# Patient Record
(demographics unavailable — no encounter records)

---

## 2024-10-24 NOTE — HISTORY OF PRESENT ILLNESS
[FreeTextEntry1] : 53M presents with bilateral submet 1 wounds. He applies Iodosorb to the right foot wound. His lateral right foot blister has since healed since last visit. Pt has been ambulating in sneakers with . Denies N/V/F/C/SOB.   7/18 L foot culture: Serratia marcescens, Staph psuedointermedius, GBS 8/2024 A1c: "a little over 10%" 10/24/24 BS (after breakfast): 163mg/dL

## 2024-10-24 NOTE — ASSESSMENT
[FreeTextEntry1] : 53M with right foot submet 1 wound to subq and right foot submet 5/ left submet 1 hyperkeratotic lesion - Pt seen and evaluated. - Right foot submet 1 wound to subQ, fibrogranular wound bed, hyperkeratotic periwound, no tracking, circumferential undermining, no drainage, no acute signs of infection. Right foot submet 5 metatarsal head callus, no acute signs of infection. Left foot submet 1 hyperkeratosis, no acute signs of infection. - Used a sterile #10 blade to excisionally debride the right foot submet 1 wound down to the level of but not beyond subQ.  - Used sterile #10 blade to excisionally debride the right foot submet 5 and left foot submet 1 hyperkeratotic lesion down to and not including dermis and not beyond. Patient tolerated procedure well.  -Cauterized the right foot submet 1 wound with silver nitrate. - Applied iodosorb to the right foot submet 1 wound followed by 4x4 gauze and eusebia. - Patient is to continue applying iodosorb to the right foot submet 1 wound. - RTC in 3 weeks.

## 2024-10-24 NOTE — ADDENDUM
[FreeTextEntry1] : Patient needs new orthotics due to worn out orthotics that have been working to reduce pressure on bilateral sesamoids. They are used everyday and are worn out. Patient needs new orthotics to maintain healing.

## 2024-10-24 NOTE — PHYSICAL EXAM
[1+] : left 1+ [Please See PDF for [employer]] : Please See PDF for [employer]. [de-identified] : No limb length deformity, external hip rotation 45 degrees b/l internal 25 degrees b/l. patella midline. Ankle ROM: 0 w/ knee extended 5 with knee flexed b/l. STJ ROM: 20 degrees inv, 10 degrees eversion b/l. MTJ ROM: 15 degrees eversion 20 eversion R, 10 inv, 20 ev L, 1st ray ROM: 5mm dorsiflexion plantarflexion b/l. Hallux ROM: reduced to 25 dorsi R 15 dorsi L, 35 plantarflexion R, 20 plantarflexion L. Arch height 1/5 b/l.  [de-identified] : Right foot submet 1 wound to subq, fibrogranular wound bed, hyperkeratotic periwound, no tracking, no undermining, no drainage, no acute signs of infection. Right foot dorsal to plantar 5th met head bulla, fluctuant, no acute signs of infection. Left foot submet 1 hyperkeratosis, no acute signs of infection. [de-identified] : BL sensation diminished to the level of the toes. [de-identified] : Right foot submet 1 wound to Dermis with hyperkeratosis overlying the wound, no drainage or erythema, Left submet 1 wound to subQ - no reoppned, mild sanginous drainage, hyperkeratotic wound edges, no malodor, no acute signs of infection.

## 2024-12-05 NOTE — ADDENDUM
Pt seen 7/18/17 [FreeTextEntry1] : Patient needs new orthotics due to worn out orthotics that have been working to reduce pressure on bilateral sesamoids. They are used everyday and are worn out. Patient needs new orthotics to maintain healing.

## 2024-12-05 NOTE — ASSESSMENT
[FreeTextEntry1] : 53M with right foot submet 1 wound to subq and right foot submet 5/ left submet 1 hyperkeratotic lesion - Pt seen and evaluated. - Right foot submet 1 wound to subQ, fibronecrotic wound bed, hyperkeratotic periwound, no tracking, circumferential undermining, no drainage, mild malodor, no acute signs of infection. Right foot submet 5 metatarsal head callus, no acute signs of infection. Left foot submet 1 hyperkeratotic lesion  - Used a sterile #10 blade to excisionally debride the right foot submet 1 and left foot submet 1 wound down to the level of but not beyond subQ but not beyond.  - Used sterile #10 blade to excisionally debride the right foot submet 5 and left foot submet 1 hyperkeratotic lesion down to and not including dermis and not beyond. Patient tolerated procedure well.  - Rx Doxycycline x 10 days with 2 refills  - Cauterized the right foot submet 1 wound with silver nitrate  - Applied iodosorb to the right foot submet 1 and left foot submet 1 wound followed by 4x4 gauze and eusebia. - RTC in 2 weeks.

## 2024-12-05 NOTE — HISTORY OF PRESENT ILLNESS
[FreeTextEntry1] : 53M presents with bilateral submet 1 wounds. He applies Iodosorb to the right foot wound. His lateral right foot blister has since healed since last visit. Pt has been ambulating in sneakers with . Denies N/V/F/C/SOB.   7/18 L foot culture: Serratia marcescens, Staph psuedointermedius, GBS 8/2024 A1c: "a little over 10%" 10/24/24 BS (after breakfast): 201mg/dL

## 2024-12-05 NOTE — PHYSICAL EXAM
[1+] : left 1+ [Please See PDF for [employer]] : Please See PDF for [employer]. [de-identified] : No limb length deformity, external hip rotation 45 degrees b/l internal 25 degrees b/l. patella midline. Ankle ROM: 0 w/ knee extended 5 with knee flexed b/l. STJ ROM: 20 degrees inv, 10 degrees eversion b/l. MTJ ROM: 15 degrees eversion 20 eversion R, 10 inv, 20 ev L, 1st ray ROM: 5mm dorsiflexion plantarflexion b/l. Hallux ROM: reduced to 25 dorsi R 15 dorsi L, 35 plantarflexion R, 20 plantarflexion L. Arch height 1/5 b/l.  [de-identified] : Right foot submet 1 wound to subq, fibrogranular wound bed, hyperkeratotic periwound, no tracking, no undermining, no drainage, no acute signs of infection. Right foot dorsal to plantar 5th met head bulla, fluctuant, no acute signs of infection. Left foot submet 1 hyperkeratosis, no acute signs of infection. [de-identified] : BL sensation diminished to the level of the toes. [de-identified] : Right foot submet 1 wound to Dermis with hyperkeratosis overlying the wound, no drainage or erythema, Left submet 1 wound to subQ - no reoppned, mild sanginous drainage, hyperkeratotic wound edges, no malodor, no acute signs of infection.

## 2024-12-19 NOTE — ASSESSMENT
[FreeTextEntry1] : 53M with right foot submet 1 wound to subq and right foot submet 5/ left submet 1 hyperkeratotic lesion - Pt seen and evaluated. - Right foot submet 1 wound to subQ, granular wound bed, hyperkeratotic periwound, no tracking, circumferential undermining, no drainage, mild malodor, no acute signs of infection. Right foot submet 5 metatarsal head callus, no acute signs of infection. Left foot submet 1 hyperkeratotic lesion.  - Used a sterile #10 blade to excisionally debride the right foot submet 1 and left foot submet 1 wound down to the level of but not beyond subQ but not beyond.  - Used sterile #10 blade to excisionally debride the right foot submet 5 and left foot submet 1 hyperkeratotic lesion down to and not including dermis and not beyond. Patient tolerated procedure well.  - Continue Doxycycline.  Last visit provided Rx for Doxycycline x 10 days with 2 refills  - Cauterized the right foot submet 1 wound with silver nitrate  - Applied acticoat to the right foot submet 1 and left foot submet 1 wound followed by 4x4 gauze and eusebia. - RTC in 2 weeks.

## 2024-12-19 NOTE — PHYSICAL EXAM
[1+] : left 1+ [Please See PDF for [employer]] : Please See PDF for [employer]. [de-identified] : No limb length deformity, external hip rotation 45 degrees b/l internal 25 degrees b/l. patella midline. Ankle ROM: 0 w/ knee extended 5 with knee flexed b/l. STJ ROM: 20 degrees inv, 10 degrees eversion b/l. MTJ ROM: 15 degrees eversion 20 eversion R, 10 inv, 20 ev L, 1st ray ROM: 5mm dorsiflexion plantarflexion b/l. Hallux ROM: reduced to 25 dorsi R 15 dorsi L, 35 plantarflexion R, 20 plantarflexion L. Arch height 1/5 b/l.  [de-identified] : Right foot submet 1 wound to subq, fibrogranular wound bed, hyperkeratotic periwound, no tracking, no undermining, no drainage, no acute signs of infection. Right foot dorsal to plantar 5th met head bulla, fluctuant, no acute signs of infection. Left foot submet 1 hyperkeratosis, no acute signs of infection. [de-identified] : BL sensation diminished to the level of the toes. [de-identified] : Right foot submet 1 wound to Dermis with hyperkeratosis overlying the wound, no drainage or erythema, Left submet 1 wound to subQ - no reoppned, mild sanginous drainage, hyperkeratotic wound edges, no malodor, no acute signs of infection.

## 2025-03-06 NOTE — HISTORY OF PRESENT ILLNESS
[FreeTextEntry1] : 53M presents with bilateral submet 1 wounds. He applies Iodosorb to the right foot wound. His lateral right foot blister has since healed since last visit. Pt has been ambulating in sneakers with . Pt states he has not worn his orthotic for months secondary to them "not working" and the wound contaminating the orthotic. Denies N/V/F/C/SOB.    Does not recall last A1c - scheduled to see Rodrick in April  3/6/24: 175 mg/dl

## 2025-03-06 NOTE — ASSESSMENT
[FreeTextEntry1] : 53M with right foot submet 1 wound to subq and right foot submet 1 hyperkeratotic lesion - Pt seen and evaluated. - Right foot submet 1 wound to subQ, granular wound bed, hyperkeratotic periwound, no tracking, circumferential undermining, no drainage, mild malodor, no acute signs of infection. Right foot submet 1 wound to dermis, no acute signs of infection.  - Used a sterile #10 blade to excisionally debride the right foot submet 1 and left foot submet 1 wound down to the level of but not beyond subQ but not beyond.  - Used sterile #10 blade to excisionally debride the right foot submet 1 hyperkeratotic lesion down to and not including dermis and not beyond. Patient tolerated procedure well.  - Cauterized the right foot submet 1 wound with silver nitrate  - Applied silver nitrate, acticote to the right foot submet 1 followed by 4x4 gauze and eusebia. - Recommend pt return to [facility] with orthotic in order for adequate adjusts to be made. Recommend re-staring the orthotic and wearing it all times.  - Recommend off loading of right lower extremity  - RTC in 3 weeks.

## 2025-03-06 NOTE — PHYSICAL EXAM
[1+] : left 1+ [Please See PDF for [employer]] : Please See PDF for [employer]. [de-identified] : No limb length deformity, external hip rotation 45 degrees b/l internal 25 degrees b/l. patella midline. Ankle ROM: 0 w/ knee extended 5 with knee flexed b/l. STJ ROM: 20 degrees inv, 10 degrees eversion b/l. MTJ ROM: 15 degrees eversion 20 eversion R, 10 inv, 20 ev L, 1st ray ROM: 5mm dorsiflexion plantarflexion b/l. Hallux ROM: reduced to 25 dorsi R 15 dorsi L, 35 plantarflexion R, 20 plantarflexion L. Arch height 1/5 b/l.  [de-identified] : Right foot submet 1 wound to subq, fibrogranular wound bed, hyperkeratotic periwound, no tracking, no undermining, no drainage, no acute signs of infection. Right foot dorsal to plantar 5th met head bulla, fluctuant, no acute signs of infection. Left foot submet 1 hyperkeratosis, no acute signs of infection. [de-identified] : BL sensation diminished to the level of the toes. [de-identified] : - Right foot submet 1 wound to subQ, granular wound bed, hyperkeratotic periwound, no tracking, circumferential undermining, no drainage, mild malodor, no acute signs of infection. Right foot submet 1 wound to dermis, no acute signs of infection.

## 2025-04-03 NOTE — ASSESSMENT
[FreeTextEntry1] : 53M with right foot submet 1 wound to subq and right foot submet 1 hyperkeratotic lesion - Pt seen and evaluated. - Right foot submet 1 wound to subQ, granular wound bed, hyperkeratotic periwound, no tracking, circumferential undermining, no drainage, mild malodor, no acute signs of infection. Right foot submet 1 wound to dermis, no acute signs of infection.  - Used a sterile #10 blade to excisionally debride the right foot submet 1 wound down to the level of subQ but not beyond. Patient tolerated procedure well.  - Used sterile #10 blade to excisionally debride the left foot submet 1 hyperkeratotic lesion down to including subQ but not beyond. Left foot submet 1 wound to subQ, no malodor, no pus. Patient tolerated procedure well.  - Cauterized the right foot submet 1 wound with silver nitrate  - Applied silver nitrate, acticote to the right foot submet 1 followed by 4x4 gauze and eusebia. - Recommend off loading of right lower extremity  - RTC in 3 weeks.

## 2025-04-03 NOTE — HISTORY OF PRESENT ILLNESS
[FreeTextEntry1] : 53M presents with bilateral submet 1 wounds. He applies Iodosorb to the right foot wound. His lateral right foot blister has since healed since last visit. Pt has been ambulating in sneakers with . Pt states he has not worn his orthotic for months secondary to them "not working" and the wound contaminating the orthotic. Denies N/V/F/C/SOB.    Does not recall last A1c - scheduled to see Rodrick in July 4/3/24: 171 mg/dl

## 2025-04-03 NOTE — PHYSICAL EXAM
[1+] : left 1+ [Please See PDF for [employer]] : Please See PDF for [employer]. [de-identified] : No limb length deformity, external hip rotation 45 degrees b/l internal 25 degrees b/l. patella midline. Ankle ROM: 0 w/ knee extended 5 with knee flexed b/l. STJ ROM: 20 degrees inv, 10 degrees eversion b/l. MTJ ROM: 15 degrees eversion 20 eversion R, 10 inv, 20 ev L, 1st ray ROM: 5mm dorsiflexion plantarflexion b/l. Hallux ROM: reduced to 25 dorsi R 15 dorsi L, 35 plantarflexion R, 20 plantarflexion L. Arch height 1/5 b/l.  [de-identified] : Right foot submet 1 wound to subq, fibrogranular wound bed, hyperkeratotic periwound, no tracking, no undermining, no drainage, no acute signs of infection. Right foot dorsal to plantar 5th met head bulla, fluctuant, no acute signs of infection. Left foot submet 1 hyperkeratosis, no acute signs of infection. [de-identified] : BL sensation diminished to the level of the toes. [de-identified] : - Right foot submet 1 wound to subQ, granular wound bed, hyperkeratotic periwound, no tracking, circumferential undermining, no drainage, mild malodor, no acute signs of infection. Right foot submet 1 wound to dermis, no acute signs of infection.

## 2025-04-16 NOTE — HISTORY OF PRESENT ILLNESS
[FreeTextEntry1] : ER f/u [de-identified] : Mr. ROCHELLE MOSCOSO is a 54 year old male hx of DM-uncontrolled with a1c >10, diabetic foot ulcer following w/ wound care p/w lower back pain. States pain began across lower back went to ER as was concerned about stone. CT negative for stone, however showed  mild inguinal LAD, hepatomegaly. He was discharged w/ muscle relaxer, prednisone 20mg, and ibuprofen.  Did not see improvement with one tab of prednisone he took-decided to stop. FS not at goal currently in 200s

## 2025-04-16 NOTE — HISTORY OF PRESENT ILLNESS
[FreeTextEntry1] : ER f/u [de-identified] : Mr. ROCHELLE MOSCOSO is a 54 year old male hx of DM-uncontrolled with a1c >10, diabetic foot ulcer following w/ wound care p/w lower back pain. States pain began across lower back went to ER as was concerned about stone. CT negative for stone, however showed  mild inguinal LAD, hepatomegaly. He was discharged w/ muscle relaxer, prednisone 20mg, and ibuprofen.  Did not see improvement with one tab of prednisone he took-decided to stop. FS not at goal currently in 200s

## 2025-04-16 NOTE — PHYSICAL EXAM
[Normal] : no acute distress, well nourished, well developed and well-appearing [No CVA Tenderness] : no CVA  tenderness [No Spinal Tenderness] : no spinal tenderness [Grossly Normal Strength/Tone] : grossly normal strength/tone

## 2025-04-16 NOTE — COUNSELING
[Encouraged to increase physical activity] : Encouraged to increase physical activity I will STOP taking the medications listed below when I get home from the hospital:  None

## 2025-04-24 NOTE — ASSESSMENT
[FreeTextEntry1] : 53M with right foot submet 1 wound to subq and right foot submet 1 hyperkeratotic lesion - Pt seen and evaluated. - Right foot submet 1 wound to subQ, granular wound bed, hyperkeratotic periwound, no tracking, circumferential undermining, no drainage, mild malodor, no acute signs of infection. Right foot submet 1 wound to dermis, no acute signs of infection. new blister noted to the dorsal lateral forefoot. - Used a sterile #10 blade to excisionally debride the right foot submet 1 wound down to the level of subQ but not beyond. Patient tolerated procedure well.  - Used sterile #10 blade to excisionally debride the left foot submet 1 hyperkeratotic lesion down to including subQ but not beyond. Left foot submet 1 wound to subQ, no malodor, no pus. Patient tolerated procedure well.  -Right foot blister was lanced while keeping roof intact - Applied gv, acticote to the right foot submet 1 followed by 4x4 gauze and eusebia. - Recommend off loading of right lower extremity  -Aseptic debridmeent of toenails 1-5 bilaterally  - RTC in 2 weeks. 
Unable to determine Suicide Risk

## 2025-04-24 NOTE — PHYSICAL EXAM
[1+] : left 1+ [Please See PDF for [employer]] : Please See PDF for [employer]. [de-identified] : No limb length deformity, external hip rotation 45 degrees b/l internal 25 degrees b/l. patella midline. Ankle ROM: 0 w/ knee extended 5 with knee flexed b/l. STJ ROM: 20 degrees inv, 10 degrees eversion b/l. MTJ ROM: 15 degrees eversion 20 eversion R, 10 inv, 20 ev L, 1st ray ROM: 5mm dorsiflexion plantarflexion b/l. Hallux ROM: reduced to 25 dorsi R 15 dorsi L, 35 plantarflexion R, 20 plantarflexion L. Arch height 1/5 b/l.  [de-identified] : Right foot submet 1 wound to subq, fibrogranular wound bed, hyperkeratotic periwound, no tracking, no undermining, no drainage, no acute signs of infection. Right foot dorsal to plantar 5th met head bulla, fluctuant, no acute signs of infection. Left foot submet 1 hyperkeratosis, no acute signs of infection. [de-identified] : BL sensation diminished to the level of the toes. [de-identified] : - Right foot submet 1 wound to subQ, granular wound bed, hyperkeratotic periwound, no tracking, circumferential undermining, no drainage, mild malodor, no acute signs of infection. Right foot submet 1 wound to dermis, no acute signs of infection.

## 2025-04-24 NOTE — HISTORY OF PRESENT ILLNESS
[FreeTextEntry1] : 53M presents with bilateral submet 1 wounds. He applies Iodosorb to the right foot wound. His lateral right foot blister has since healed since last visit. Pt has been ambulating in sneakers with . Pt states he has not worn his orthotic for months secondary to them "not working" and the wound contaminating the orthotic. Denies N/V/F/C/SOB.    Does not recall last A1c - scheduled to see Rodrick in July 4/24/25: 186 mg/dl

## 2025-05-08 NOTE — ASSESSMENT
[FreeTextEntry1] : 54M with right foot submet 1 wound to subq and lateral 5th metatarsal head deroofed bulla, and left foot submet 1 hyperkeratotic lesion. - Pt seen and evaluated. - Right foot submet 1 wound to subq, granular wound bed, mild hyperkeratotic periwound, no tracking, no undermining, no drainage, no malodor, no acute signs of infection. Left foot lateral 5th metatarsal head de-roofed bulla, no acute signs of infection. Right foot submet 1 hyperkeratosis, no open wound, no acute signs of infection. - Used a sterile #10 blade to excisionally debride the right foot submet 1 wound down to the level of subq but not beyond. - Used sterile #10 blade to excisionally debride the left foot submet 1 hyperkeratosis down to the level of but not beyond epidermis. - Applied gentian violet to the right foot lateral 5th metatarsal de-roofed bulla. Pt may continue to apply bacitracin to the area at home. - Applied acticoat and drawtex to the right foot submet 1 wound followed by dry sterile dressing to be continued daily at home.  - Recommend off loading the RLE with orthotics. Pt spoke to Walter about creating a dispersion pad in the orthotics so he will not have to give up his orthotics for 2 weeks. - RTC in 3 weeks.

## 2025-05-08 NOTE — PHYSICAL EXAM
[1+] : left 1+ [Please See PDF for [employer]] : Please See PDF for [employer]. [de-identified] : No limb length deformity, external hip rotation 45 degrees b/l internal 25 degrees b/l. patella midline. Ankle ROM: 0 w/ knee extended 5 with knee flexed b/l. STJ ROM: 20 degrees inv, 10 degrees eversion b/l. MTJ ROM: 15 degrees eversion 20 eversion R, 10 inv, 20 ev L, 1st ray ROM: 5mm dorsiflexion plantarflexion b/l. Hallux ROM: reduced to 25 dorsi R 15 dorsi L, 35 plantarflexion R, 20 plantarflexion L. Arch height 1/5 b/l.  [de-identified] : Right foot submet 1 wound to subq, granular wound bed, mild hyperkeratotic periwound, no tracking, no undermining, no drainage, no malodor, no acute signs of infection. Left foot lateral 5th metatarsal head de-roofed bulla, no acute signs of infection. Right foot submet 1 hyperkeratosis, no open wound, no acute signs of infection. [de-identified] : BL sensation diminished to the level of the toes. [de-identified] : - Right foot submet 1 wound to subQ, granular wound bed, hyperkeratotic periwound, no tracking, circumferential undermining, no drainage, mild malodor, no acute signs of infection. Right foot submet 1 wound to dermis, no acute signs of infection.

## 2025-05-08 NOTE — HISTORY OF PRESENT ILLNESS
[FreeTextEntry1] : 54M presents with BL submet 1 wounds. Pt was applying acticoat to the right foot wound. States his left foot wound was closed. Pt has been ambulating in sneakers with orthotics but he needs adjustments and the person he is using needs him to drop off his orthotics for 2 weeks but he feels he cannot be without his orthotics for 2 weeks. Denies N/V/F/C/SOB.    Does not recall last A1c - scheduled to see Rodrick in July 5/8/25: 155 mg/dL

## 2025-05-15 NOTE — HISTORY OF PRESENT ILLNESS
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 Has been using Trimix.  Represcribed.   H/o left ureteroscopy, removal of left UVJ stone, removed. 2016 RTC 1 month     07/12/2023: Mr. MOSCOSO is a 52 year male who presents today for a follow up for back ache and h/o left ureteral stones   Renal sonogram today. No hydronephrosis or stones. Back ache secondary to musculoskeletal and not of renal origin. .   ED: Pt has DM. has been using Trimix, 3 -4 units. Effects are less. Advised to increase to 5 units. Refilled prescriptions today.   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 Pt has been using Trimix 5 units. Pt reports it works but not as great as he would like. Pt advised to titrate to a dose that work for him 2 units at a time. Pt will continue. Rx for TriMix given today.  H/o Renal stones and (back pain): H/o left ureteroscopy, removal of left UVJ stone, removed. 2016.  Pt c/o of back pain still present on the right side. He reports  Last Renal Sonogram revealed: No hydronephrosis or stones.   Renal Sonogram: Will repeat Renal sonogram to evaluate right flank pain Findings: Both kidneys are normal in size and echogenicity without stones, hydronephrosis or solid masses visualized  Back ache secondary to musculoskeletal and not of renal origin. Pt advised follow back pain with orthopedic Dr. No further intervention at present.  Pt also advised to decrease body weight for overall health.  Current weight is 318lb BMI 39.75kg/m2  Previous [place] 8/21 was 321lb and BMI 40.12 kg/m2.   RTO 6 months follow up for visit PSA, Uroflow, PVR, and UA and Culture      05/15/2025, 53 y/o male presents with follow up visit for Right Inguinal "Lymph-node" Enlargement, ED and H/o Renal Stones  PMHx DM, HTN managed well with medications   Right Inguinal Lymph nodes:  PCP suggested patient followed with urology for "inguinal lymph nodes"  O/E:  Mass palpable on the mid aspect of upper right thigh. Non tender, non-fixed, mobile, non-pulsatile measuring 6 x 2.5 cm   ? Possible lymph nodes or lipoma? Patient advised to follow with Dr. Vides for further evaluation   H/o Renal Stones and Ureteral Stone: S/p Left ureteroscopy, removal of left UVJ stone, removed. 2016.  No Stones at present.as of CT A/P 04/08/2025.  Patient was recently [facility] ER on 4/08/25 for severe B/L back pain. CT revealed no stones, no hydronephrosis. Labs within normal limits. Back pain classified as back spasms.  Renal USG today to evaluate kidneys:  Findings: Technically difficult examination due to patient body habitus. 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 Both kidneys are normal in size and echogenicity without stones, hydronephrosis or solid masses visualized. Bladder: 128 cc Prostate: not well visualized, but does not appear enlarged.  ED: On Tri-Mix injections 5 units. Titrating as necessary. Doing well. Will continue.  Rx given  No urinary issues at present. voiding and emptying his bladder well.  Of Note:  Patient as open wound on right foot being followed by wound care management.  Plan:   Will continue TriMix injections for ED   Patient advised to follow with Dr. Vides for further evaluation of inguinal mass.  Patient advised to continue to decrease body weight for overall health.  Current weight today 327 lb, BMI 40.87 kg/m2 Previous Weight on 4/24/25 is 318lb BMI 39.75kg/m2   Patient referred to Dr. Alex for weight loss management.  RTC 6 months  follow up for visit PSA, Uroflow, PVR, and UA and Culture

## 2025-05-15 NOTE — LETTER BODY
[Dear  ___] : Dear  [unfilled], [Consult Letter:] : I had the pleasure of evaluating your patient, [unfilled]. [Please see my note below.] : Please see my note below. [Consult Closing:] : Thank you very much for allowing me to participate in the care of this patient.  If you have any questions, please do not hesitate to contact me. [Sincerely,] : Sincerely, [FreeTextEntry3] : Trae Izquierdo MD\par

## 2025-05-15 NOTE — PHYSICAL EXAM
[Normal Appearance] : normal appearance [Well Groomed] : well groomed [General Appearance - In No Acute Distress] : no acute distress [Edema] : no peripheral edema [Respiration, Rhythm And Depth] : normal respiratory rhythm and effort [Exaggerated Use Of Accessory Muscles For Inspiration] : no accessory muscle use [Abdomen Soft] : soft [Abdomen Tenderness] : non-tender [Costovertebral Angle Tenderness] : no ~M costovertebral angle tenderness [Urinary Bladder Findings] : the bladder was normal on palpation [Normal Station and Gait] : the gait and station were normal for the patient's age [] : no rash [No Focal Deficits] : no focal deficits [Oriented To Time, Place, And Person] : oriented to person, place, and time [Affect] : the affect was normal [Mood] : the mood was normal [No Palpable Adenopathy] : no palpable adenopathy [Chaperone Present] : A chaperone was present in the examining room during all aspects of the physical examination [FreeTextEntry2] :  Sagar Solorio

## 2025-05-15 NOTE — REVIEW OF SYSTEMS
[Loss of interest] : loss of interest in sexual activity [Poor quality erections] : Poor quality erections [No erections] : no erections [Seen by urologist before (Name)  ___] : Preciously seen by a urologist: [unfilled] [History of kidney stones] : history of kidney stones [Wake up at night to urinate  How many times?  ___] : wakes up to urinate [unfilled] times during the night [Negative] : Heme/Lymph

## 2025-05-15 NOTE — END OF VISIT
[Time Spent: ___ minutes] : I have spent [unfilled] minutes of time on the encounter which excludes teaching and separately reported services. [FreeTextEntry4] :  This note was written by Sagar Solorio on 05/15/2025 actively solely Trae Messina M.D. I, Sagar Solorio, am scribing for and in the presence of Trae Messina M.D. in the following sections HISTORY OF PRESENT ILLNESS, PAST MEDICAL/FAMILY/SOCIAL HISTORY; REVIEW OF SYSTEMS; VITAL SIGNS; PHYSICAL EXAM; ASSESSMENT/PLAN.     All medical record entries made by this scribe at my, Trae Messina M.D. direction and personally dictated by me on 05/15/2025. I personally performed the services described in the documentation, reviewed the documentation recorded by the scribe in my presence, and it accurately and completely records my words and actions.

## 2025-05-16 NOTE — PHYSICAL EXAM
[Soft] : abdomen soft [Non Tender] : non-tender [Abnormal Gait] : abnormal gait [No Cyanosis] : no cyanosis [No Clubbing] : no clubbing [Edema ___] : edema [unfilled] [Normal] : alert and oriented, normal memory

## 2025-05-16 NOTE — HISTORY OF PRESENT ILLNESS
[FreeTextEntry1] : 54M HTN, DM with complications/foot ulcers, hx of HLD who presents for follow-up  ET appears stable Denies CP, palpitations Exercise mostly limited by LE ulcers has been eating healthier though more sedentary and has been gaining some weight  father  MI age 56, DM  24: Chol 188//HDL 51/  22: Chol 185//HDL 60/LDL 93  22: Chol 148//HDL 51/LDL 71

## 2025-05-16 NOTE — CARDIOLOGY SUMMARY
[de-identified] : 6/13/23: ST, PVC, RSR', poor R wave progression\par  11/1/22: ST, PVC [de-identified] : 1/19/2021: EF 55%, E/A reversal, mild MAC, nl LA

## 2025-05-16 NOTE — REVIEW OF SYSTEMS
[Lower Ext Edema] : lower extremity edema [Erectile Dysfunction] : erectile dysfunction [Negative] : Heme/Lymph [SOB] : no shortness of breath [Dyspnea on exertion] : not dyspnea during exertion [Chest Discomfort] : no chest discomfort [Leg Claudication] : no intermittent leg claudication [Palpitations] : no palpitations [Orthopnea] : no orthopnea [Syncope] : no syncope [Cough] : no cough

## 2025-05-16 NOTE — DISCUSSION/SUMMARY
[FreeTextEntry1] : HTN - BP goal <130/80.  -cont amlodipine 10mg daily, -inc losartan to 100mg daily -cont chlorthalidone 25mg daily  LDL mildly elevated, near goal in past. pt hesitant, will trial rosuvastatn 5mg daily  consider GLP1ra to aid with weight loss and venous ulcers [EKG obtained to assist in diagnosis and management of assessed problem(s)] : EKG obtained to assist in diagnosis and management of assessed problem(s)

## 2025-06-06 NOTE — PHYSICAL EXAM
[JVD] : no jugular venous distention  [Normal Breath Sounds] : Normal breath sounds [Normal Heart Sounds] : normal heart sounds [Right Carotid Bruit] : no bruit heard over the right carotid [Left Carotid Bruit] : no bruit heard over the left carotid [Right Femoral Bruit] : no bruit heard over the right femoral artery [Left Femoral Bruit] : no bruit heard over the left femoral artery [1+] : left 1+ [2+] : left 2+ [Ankle Swelling (On Exam)] : not present [Varicose Veins Of Lower Extremities] : not present [] : not present [Abdomen Masses] : No abdominal masses [Tender] : was nontender [Stool Sample Taken] : No stool obtained  on rectal exam [No Rash or Lesion] : No rash or lesion [Purpura] : no purpura  [Alert] : alert [Oriented to Person] : oriented to person [Oriented to Place] : oriented to place [Oriented to Time] : oriented to time [Calm] : calm [de-identified] : Well-built obese  [de-identified] : Within normal limits [de-identified] : Within normal limits [TextEntry] : The patient is seen and examined today in supine sitting and standing positions.  Patient with obesity and a BMI of 41.  Head and neck upper extremity exams are grossly normal.  Respiratory cardiac exams are normal abdomen is soft nonpulsatile and benign.  Both groins are palpated in standing and supine position and has a multiple small masses in the subcutaneous area which are palpable and most likely consistent with the lymphadenopathy.  Both feet are also examined and patient has an open wound on the right first metatarsal head of 1.5 x 1.5 x 0.5 cm appears to be almost to the muscle and the fascia no exposed bone relatively clean inflamed and macerated tissue in the skin around it which is excised today.  The ulcer itself is not debrided.  There is some redness on the medial side of the first MP joint and appears to be from the chronic presence of the ulcer without any blistering or cellulitis or redness.  There is no drainage from the wound.  The left first met head ulcer appears to be almost healed.  No edema on the dorsum of the feet or the ankles or the legs no very venous stasis no significant changes of lipodermatosclerosis or venous insufficiency.  Patient has a 2+ dorsalis pedis bilaterally and appears to have adequate arterial circulation to the feet.

## 2025-06-06 NOTE — ASSESSMENT
[FreeTextEntry1] : It appears that patient has a bilateral inguinal lymphadenopathy and most likely secondary to the chronic open wounds on both feet over past few years.  No abscess no cellulitis no pulsatile mass in the groins.  Patient with a nonhealing diabetic foot ulcers bilaterally worse on the right first met head than the left being followed and cared by wound care center at [facility].

## 2025-06-06 NOTE — PLAN
[TextEntry] : The plan at this stage is to perform sonogram and duplex of both groins and veins to rule out any venous pathology and to evaluate for the lymph nodes.  The patient right foot dressing is also performed using Aquacel silver gauze Roel and the wound is covered and is patient is advised to keep the wound dressing on all the time and offload the right foot as much as possible.  TCC for the right foot and possible hyperbaric oxygen treatment can be considered.  The patient is scheduled next week in the office for the ultrasound.

## 2025-06-06 NOTE — HISTORY OF PRESENT ILLNESS
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 Trae Izquierdo for evaluation of firm mass lumps in both groins.  The patient states that he has been retired for past few years and has a nonhealing wound on the right foot for last 2 to 3 years and also had a nonhealing wound on the left foot for past few years.  The left foot ulcer appears to have almost healed and the right foot ulcer is still open and is being followed by Dr. Brown at [facility] wound care [place].  The patient states that he has been put putting Acticoat on the wound as well as using orthotic shoes to offload the wound.  He has a history of diabetes hypertension obesity.  He has been doing the local wound care by himself and his wife.  He has no fever or chills at this time and has no history of osteomyelitis of the foot.

## 2025-06-13 NOTE — DATA REVIEWED
[FreeTextEntry1] : The venous Doppler and ultrasound of both groins are reviewed and discussed with the patient.  Patient has a bilateral inguinal lymphadenopathy.

## 2025-06-13 NOTE — PLAN
[TextEntry] : The plan at this stage is to follow-up with me in the 6 to 8 weeks for the repeat ultrasound of both groins to evaluate for the lymph nodes size and surveillance and if the lymph nodes persist or are larger then may consider lymph node biopsies.  The patient will be followed at the wound care center at [facility] with Dr. Brown.

## 2025-06-13 NOTE — PHYSICAL EXAM
[1+] : left 1+ [2+] : left 2+ [Ankle Swelling (On Exam)] : not present [Varicose Veins Of Lower Extremities] : not present [] : not present [No Rash or Lesion] : No rash or lesion [Purpura] : no purpura  [Alert] : alert [Oriented to Person] : oriented to person [Oriented to Place] : oriented to place [Oriented to Time] : oriented to time [Calm] : calm [TextEntry] : Patient's both groins examined still has a palpable lymph nodes both groins.  The feet were not examined today.

## 2025-06-13 NOTE — HISTORY OF PRESENT ILLNESS
[FreeTextEntry1] : The patient has come to the office for the follow-up of his bilateral inguinal mass.  Patient underwent ultrasound of both lower extremity and venous Dopplers.  Patient has no DVT or no SVT on the right or left lower extremity.  Patient has a evidence of lymph nodes in both groins and more prominent on the right than the left side in the right groin node is almost 4 x 1 and half centimeter.  Patient states that he has an appointment with Dr. Brown next week.

## 2025-06-13 NOTE — ASSESSMENT
[FreeTextEntry1] : Patient with a bilateral lower extremity chronic diabetic foot ulcers.  The left foot ulcer appears to have healed and the right foot ulcer has been open for couple of years.  The lymphadenopathy in the groin most likely is arising from the chronic foot wounds.

## 2025-06-19 NOTE — ASSESSMENT
[FreeTextEntry1] : 53M with right foot submet 1 wound to subq and right foot submet 1 wound to subQ - Pt seen and evaluated. - Right foot submet 1 wound to subQ, granular wound bed, hyperkeratotic periwound, no tracking, circumferential undermining, no drainage, mild malodor, no acute signs of infection. Right foot submet 1 wound to dermis, no acute signs of infection. Right submt 5 hyperkeratotic tissue - Used a sterile #10 blade to excisionally debride the right foot submet 1 wound down to the level of subQ but not beyond. Patient tolerated procedure well.  - Used sterile #10 blade to excisionally debride the left foot submet 1 hyperkeratotic lesion down to including subQ but not beyond. Left foot submet 1 wound to subQ, no malodor, no pus. Patient tolerated procedure well.  -Right foot submet 5 hyperkeratotic tisssue debrided to the level of and not beyond the dermis with a #10 blade. Pt tolerated well. - Applied acticote to the right and left foot submet 1 followed by 4x4 gauze and eusebia. - Recommend off loading of right lower extremity  - Discussed possible total contact cast next visit on left or right submet 1 pending appearance - Discussed fabricating new custom diabetic orthotics with Walter HOBSON in 3 weeks.

## 2025-06-19 NOTE — PHYSICAL EXAM
[1+] : left 1+ [Please See PDF for [employer]] : Please See PDF for [employer]. [de-identified] : No limb length deformity, external hip rotation 45 degrees b/l internal 25 degrees b/l. patella midline. Ankle ROM: 0 w/ knee extended 5 with knee flexed b/l. STJ ROM: 20 degrees inv, 10 degrees eversion b/l. MTJ ROM: 15 degrees eversion 20 eversion R, 10 inv, 20 ev L, 1st ray ROM: 5mm dorsiflexion plantarflexion b/l. Hallux ROM: reduced to 25 dorsi R 15 dorsi L, 35 plantarflexion R, 20 plantarflexion L. Arch height 1/5 b/l.  [de-identified] : Right foot submet 1 wound to subQ, granular wound bed, hyperkeratotic periwound, no tracking, circumferential undermining, no drainage, mild malodor, no acute signs of infection. Right foot submet 1 wound to dermis, no acute signs of infection. RRight submt 5 hyperkeratotic tissue [de-identified] : BL sensation diminished to the level of the toes. [de-identified] : - Right foot submet 1 wound to subQ, granular wound bed, hyperkeratotic periwound, no tracking, circumferential undermining, no drainage, mild malodor, no acute signs of infection. Right foot submet 1 wound to dermis, no acute signs of infection.

## 2025-07-10 NOTE — PHYSICAL EXAM
[1+] : left 1+ [Please See PDF for [employer]] : Please See PDF for [employer]. [de-identified] : No limb length deformity, external hip rotation 45 degrees b/l internal 25 degrees b/l. patella midline. Ankle ROM: 0 w/ knee extended 5 with knee flexed b/l. STJ ROM: 20 degrees inv, 10 degrees eversion b/l. MTJ ROM: 15 degrees eversion 20 eversion R, 10 inv, 20 ev L, 1st ray ROM: 5mm dorsiflexion plantarflexion b/l. Hallux ROM: reduced to 25 dorsi R 15 dorsi L, 35 plantarflexion R, 20 plantarflexion L. Arch height 1/5 b/l.  [de-identified] : Right foot submet 1 wound to subQ, granular wound bed, hyperkeratotic periwound, no tracking, circumferential undermining, no drainage, mild malodor, no acute signs of infection. Right foot submet 1 wound to dermis, no acute signs of infection. RRight submt 5 hyperkeratotic tissue [de-identified] : BL sensation diminished to the level of the toes. [de-identified] : - Right foot submet 1 wound to subQ, granular wound bed, hyperkeratotic periwound, no tracking, circumferential undermining, no drainage, mild malodor, no acute signs of infection. Right foot submet 1 wound to dermis, no acute signs of infection.

## 2025-07-10 NOTE — ADDENDUM
[FreeTextEntry1] : Patient needs new orthotics due to worn out orthotics that have been working to reduce pressure on bilateral sesamoids. They are used everyday and are worn out. Patient needs new orthotics to maintain healing. Patient agrees to total contact casting  next visit and will need to wear sweat pants or shorts and will be unable to drive.

## 2025-07-10 NOTE — HISTORY OF PRESENT ILLNESS
[FreeTextEntry1] : 53M presents with bilateral submet 1 wounds. He states that he has stopped applying Iodosorb due to the acticoat application. His lateral right foot blister has since healed since last visit. Pt has been ambulating in sneakers with . Pt states he has not worn his orthotic for months secondary to them "not working" and the wound contaminating the orthotic. Denies N/V/F/C/SOB.    Does not recall last A1c - scheduled to see Rodrick in July 7/10/25: 167 mg/dl

## 2025-07-10 NOTE — ASSESSMENT
[FreeTextEntry1] : 53M with right foot submet 1 wound to subq and right foot submet 1 wound to subQ - Pt seen and evaluated. - Right foot submet 1 wound to subQ, granular wound bed, hyperkeratotic periwound, no tracking, circumferential undermining, no drainage, mild malodor, no acute signs of infection. Left foot submet 1 wound to dermis, no acute signs of infection. Right submt 5 hyperkeratotic tissue - Used a sterile #10 blade to excisionally debride the right foot submet 1 wound down to the level of subQ but not beyond. Patient tolerated procedure well.  - Cauterized with silver nitrate, applied acticote to the right  submet 1 followed by 4x4 gauze and eusebia. - Recommend off loading of right lower extremity  - Discussed possible total contact cast next visit on the right submet 1 pending appearance -Custom orthotic padding was added for right foot submet 1 cut out to offload.  - RTC in 2 weeks.

## 2025-07-24 NOTE — PHYSICAL EXAM
[1+] : left 1+ [Please See PDF for [employer]] : Please See PDF for [employer]. [de-identified] : No limb length deformity, external hip rotation 45 degrees b/l internal 25 degrees b/l. patella midline. Ankle ROM: 0 w/ knee extended 5 with knee flexed b/l. STJ ROM: 20 degrees inv, 10 degrees eversion b/l. MTJ ROM: 15 degrees eversion 20 eversion R, 10 inv, 20 ev L, 1st ray ROM: 5mm dorsiflexion plantarflexion b/l. Hallux ROM: reduced to 25 dorsi R 15 dorsi L, 35 plantarflexion R, 20 plantarflexion L. Arch height 1/5 b/l.  [de-identified] : Right foot submet 1 wound to subQ, granular wound bed, hyperkeratotic periwound, no tracking, circumferential undermining, no drainage, mild malodor, no acute signs of infection. Right foot submet 1 wound to dermis, no acute signs of infection. RRight submt 5 hyperkeratotic tissue [de-identified] : BL sensation diminished to the level of the toes. [de-identified] : - Right foot submet 1 wound to subQ, granular wound bed, hyperkeratotic periwound, no tracking, circumferential undermining, no drainage, mild malodor, no acute signs of infection. Right foot submet 1 wound to dermis, no acute signs of infection.

## 2025-07-24 NOTE — ASSESSMENT
[FreeTextEntry1] : 53M with right foot submet 1 wound to subq and left  foot submet 1 wound to subQ - Pt seen and evaluated. - Right foot submet 1 wound to subQ, granular wound bed, hyperkeratotic periwound, no tracking, circumferential undermining, no drainage, mild malodor, no acute signs of infection. Left foot submet 1 wound to subq, no erythema, no malodor, no drainage, no acute signs of infection. Right submt 5 hyperkeratotic tissue - Used a sterile #10 blade to excisionally debrided the right foot and left foot submet 1 wound down to the level of subQ but not beyond. Patient tolerated procedure well.  - Cauterized with silver nitrate, applied acticote to the right  submet 1 followed by 4x4 gauze and eusebia. - Cauterized with silver nitrate, applied queenie to left foot submet 1 followed by 4x4 gauze and tape. -Daily dressing changes for left foot wound with queenie, 4x4 gauze and tape -right lower extremity total contact cast applied - RTC in 1 weeks.

## 2025-07-24 NOTE — HISTORY OF PRESENT ILLNESS
[FreeTextEntry1] : 53M presents with bilateral submet 1 wounds. He states that he has stopped applying Iodosorb due to the acticoat application. His lateral right foot blister has since healed since last visit. Pt has been ambulating in sneakers with . Pt states he has not worn his orthotic for months secondary to them "not working" and the wound contaminating the orthotic. Denies N/V/F/C/SOB.    Does not recall last A1c - scheduled to see Endo in July 7/24/25: 199 mg/dl   7/24 - Pt presents for Right foot submet 1 wound but also having drainage on the left foot submet 1. Denies fever, chills.